# Patient Record
Sex: FEMALE | Race: WHITE | NOT HISPANIC OR LATINO
[De-identification: names, ages, dates, MRNs, and addresses within clinical notes are randomized per-mention and may not be internally consistent; named-entity substitution may affect disease eponyms.]

---

## 2022-02-16 PROBLEM — Z00.00 ENCOUNTER FOR PREVENTIVE HEALTH EXAMINATION: Status: ACTIVE | Noted: 2022-02-16

## 2022-03-01 ENCOUNTER — NON-APPOINTMENT (OUTPATIENT)
Age: 82
End: 2022-03-01

## 2022-03-01 ENCOUNTER — APPOINTMENT (OUTPATIENT)
Dept: CARDIOLOGY | Facility: CLINIC | Age: 82
End: 2022-03-01
Payer: MEDICARE

## 2022-03-01 VITALS
BODY MASS INDEX: 33.18 KG/M2 | HEART RATE: 66 BPM | DIASTOLIC BLOOD PRESSURE: 70 MMHG | SYSTOLIC BLOOD PRESSURE: 140 MMHG | HEIGHT: 60 IN | WEIGHT: 169 LBS

## 2022-03-01 DIAGNOSIS — E78.00 PURE HYPERCHOLESTEROLEMIA, UNSPECIFIED: ICD-10-CM

## 2022-03-01 DIAGNOSIS — I35.0 NONRHEUMATIC AORTIC (VALVE) STENOSIS: ICD-10-CM

## 2022-03-01 DIAGNOSIS — I25.10 ATHEROSCLEROTIC HEART DISEASE OF NATIVE CORONARY ARTERY W/OUT ANGINA PECTORIS: ICD-10-CM

## 2022-03-01 PROCEDURE — 93000 ELECTROCARDIOGRAM COMPLETE: CPT

## 2022-03-01 PROCEDURE — 99204 OFFICE O/P NEW MOD 45 MIN: CPT

## 2022-03-01 NOTE — HISTORY OF PRESENT ILLNESS
[FreeTextEntry1] : 81 year old woman with known aortic stenosis.  She has had increasing dyspnea on exertion without chest discomfort or syncope.  On a recent cardiac catheterization she was found to have moderate to severe aortic stenosis and nonobstructive coronary disease.  She has a past history of DVT treated with warfarin but no evidence of PE on CTA.

## 2022-03-01 NOTE — DISCUSSION/SUMMARY
[FreeTextEntry1] : Patient with moderate to severe aortic stenosis and increasing YEBOAH.  Her previous cardiac cath, echo and CTA were reviewed.  She is a candidate for transcatheter aortic valve replacement.  This procedure and the need for a structural heart CTA and BARTOLOME were explained to the patient.

## 2022-03-01 NOTE — PHYSICAL EXAM

## 2022-03-22 ENCOUNTER — APPOINTMENT (OUTPATIENT)
Dept: CARDIOTHORACIC SURGERY | Facility: CLINIC | Age: 82
End: 2022-03-22

## 2022-03-22 ENCOUNTER — NON-APPOINTMENT (OUTPATIENT)
Age: 82
End: 2022-03-22

## 2022-04-26 ENCOUNTER — OUTPATIENT (OUTPATIENT)
Dept: OUTPATIENT SERVICES | Facility: HOSPITAL | Age: 82
LOS: 1 days | End: 2022-04-26
Payer: MEDICARE

## 2022-04-26 ENCOUNTER — APPOINTMENT (OUTPATIENT)
Dept: CARDIOTHORACIC SURGERY | Facility: CLINIC | Age: 82
End: 2022-04-26

## 2022-04-26 ENCOUNTER — APPOINTMENT (OUTPATIENT)
Dept: CARDIOTHORACIC SURGERY | Facility: CLINIC | Age: 82
End: 2022-04-26
Payer: MEDICARE

## 2022-04-26 ENCOUNTER — NON-APPOINTMENT (OUTPATIENT)
Age: 82
End: 2022-04-26

## 2022-04-26 VITALS
DIASTOLIC BLOOD PRESSURE: 68 MMHG | RESPIRATION RATE: 20 BRPM | HEART RATE: 72 BPM | BODY MASS INDEX: 33.18 KG/M2 | OXYGEN SATURATION: 93 % | WEIGHT: 169 LBS | SYSTOLIC BLOOD PRESSURE: 124 MMHG | TEMPERATURE: 98.7 F | HEIGHT: 60 IN

## 2022-04-26 DIAGNOSIS — R06.00 DYSPNEA, UNSPECIFIED: ICD-10-CM

## 2022-04-26 DIAGNOSIS — Z86.718 PERSONAL HISTORY OF OTHER VENOUS THROMBOSIS AND EMBOLISM: ICD-10-CM

## 2022-04-26 DIAGNOSIS — Z80.0 FAMILY HISTORY OF MALIGNANT NEOPLASM OF DIGESTIVE ORGANS: ICD-10-CM

## 2022-04-26 DIAGNOSIS — I35.0 NONRHEUMATIC AORTIC (VALVE) STENOSIS: ICD-10-CM

## 2022-04-26 DIAGNOSIS — Z78.9 OTHER SPECIFIED HEALTH STATUS: ICD-10-CM

## 2022-04-26 DIAGNOSIS — Z80.6 FAMILY HISTORY OF LEUKEMIA: ICD-10-CM

## 2022-04-26 PROCEDURE — 93306 TTE W/DOPPLER COMPLETE: CPT

## 2022-04-26 PROCEDURE — 93306 TTE W/DOPPLER COMPLETE: CPT | Mod: 26

## 2022-04-26 PROCEDURE — 99204 OFFICE O/P NEW MOD 45 MIN: CPT

## 2022-04-26 RX ORDER — LEVOTHYROXINE SODIUM 0.05 MG/1
50 TABLET ORAL DAILY
Refills: 0 | Status: ACTIVE | COMMUNITY

## 2022-04-26 RX ORDER — ZOLPIDEM TARTRATE 5 MG/1
5 TABLET, FILM COATED ORAL
Refills: 0 | Status: ACTIVE | COMMUNITY
Start: 2022-04-26

## 2022-04-26 RX ORDER — DULOXETINE HYDROCHLORIDE 20 MG/1
20 CAPSULE, DELAYED RELEASE ORAL DAILY
Refills: 0 | Status: ACTIVE | COMMUNITY
Start: 2022-04-26

## 2022-04-26 RX ORDER — MULTIVIT-MIN/FA/LYCOPEN/LUTEIN .4-300-25
TABLET ORAL DAILY
Refills: 0 | Status: ACTIVE | COMMUNITY
Start: 2022-04-26

## 2022-04-26 RX ORDER — OMEPRAZOLE 10 MG/1
10 CAPSULE, DELAYED RELEASE ORAL DAILY
Refills: 0 | Status: ACTIVE | COMMUNITY

## 2022-04-26 RX ORDER — ATORVASTATIN CALCIUM 20 MG/1
20 TABLET, FILM COATED ORAL EVERY OTHER DAY
Refills: 0 | Status: ACTIVE | COMMUNITY

## 2022-04-26 RX ORDER — WARFARIN 5 MG/1
5 TABLET ORAL DAILY
Refills: 0 | Status: ACTIVE | COMMUNITY

## 2022-04-26 RX ORDER — GABAPENTIN 300 MG/1
300 CAPSULE ORAL DAILY
Refills: 0 | Status: ACTIVE | COMMUNITY

## 2022-04-26 RX ORDER — BISOPROLOL FUMARATE 10 MG/1
10 TABLET, FILM COATED ORAL DAILY
Refills: 0 | Status: ACTIVE | COMMUNITY

## 2022-04-26 NOTE — PHYSICAL EXAM
[Well Developed] : well developed [Well Nourished] : well nourished [Normal] : normal venous pressure, no carotid bruit [Normal Rate] : normal [Rhythm Regular] : regular [II] : a grade 2 [I] : a grade 1 [___ +] : bilateral [unfilled]U+ pitting edema to the ankles [Soft] : abdomen soft [Non Tender] : non-tender [Normal Gait] : normal gait [No Rash] : no rash [Moves all extremities] : moves all extremities [Alert and Oriented] : alert and oriented [Right Carotid Bruit] : no bruit heard over the right carotid [Left Carotid Bruit] : no bruit heard over the left carotid [de-identified] : faint bibasilar crackles [de-identified] : trace tibial edema bilaterally

## 2022-04-26 NOTE — DISCUSSION/SUMMARY
[FreeTextEntry1] : Mrs Siddiqi presents for evaluation of aortic stenosis, which appears moderate on our TTE today. Her symptoms of fatigue and YEBOAH are likely multifactorial in etiology and due in part to her AS, NOEMY (for which she just recently began using CPAP), orthopedic issues, spinal stenosis with peripheral neuropathy, and body habitus with a component of deconditioning. Given that her AS calculates to solid moderate on her TTE today, I have discussed consideration for the EXPAND II study, which we expect to be able to enroll patients in shortly (i.e. in the next month or so). The study is for patients with symptomatic moderate AS and is randomized to [facility] vs. medical management with continued close monitoring and follow up. She would like to be considered for the EXPAND II study and we will call her when it is activated and we are able to to consent; in the meantime she will schedule a follow up appointment to see our team in approximately four months for follow up of moderate AS. Of additional note, she has concerns regarding what she describes as a labile BP, for which she is taking her 10mg of Bisoprolol inconsistently (i.e. as needed, when her BP is elevated). I recommended she consider decreasing her dose to 5mg daily, but will defer to Dr. Nascimento for management and optimization of her HTN regimen--she will discuss this with him.

## 2022-04-26 NOTE — REVIEW OF SYSTEMS
[Feeling Fatigued] : feeling fatigued [Dyspnea on exertion] : dyspnea during exertion [Joint Pain] : joint pain [Negative] : Heme/Lymph [Fever] : no fever [Chills] : no chills [Chest Discomfort] : no chest discomfort [Lower Ext Edema] : no extremity edema [Palpitations] : no palpitations [Orthopnea] : no orthopnea [PND] : no PND [Abdominal Pain] : no abdominal pain [Change in Appetite] : no change in appetite [Dizziness] : no dizziness [FreeTextEntry4] : recent dental work completed [FreeTextEntry9] : spinal stenosis

## 2022-04-26 NOTE — CARDIOLOGY SUMMARY
[de-identified] : 11/30/21\par \par EF 63%, CLARE 1sqcm, mGr 13 mmHg, PGr 24 mmHg, DVI 0.39 [de-identified] : 2/8/22\par pLAD 50-60% (iFR 0.96) , mLAD nml, Cx mild, pRCA 60-70% (iFR 0.96)\par \par

## 2022-06-20 ENCOUNTER — APPOINTMENT (OUTPATIENT)
Dept: PHYSICAL MEDICINE AND REHAB | Facility: CLINIC | Age: 82
End: 2022-06-20

## 2022-08-30 ENCOUNTER — APPOINTMENT (OUTPATIENT)
Dept: CARDIOTHORACIC SURGERY | Facility: CLINIC | Age: 82
End: 2022-08-30